# Patient Record
(demographics unavailable — no encounter records)

---

## 2024-10-17 NOTE — DISCUSSION/SUMMARY
[de-identified] : Chief complaint: Left lower extremity limp   HPI: Patient is a 31-year-old male with a history of autism who is nonverbal who presents the office today accompanied by his father for the evaluation of intermittent left lower extremity limp for approximately 6-7 weeks.  Patient's father reports that over the past 6-7 weeks the patient has been waking up with a limp to the left lower extremity which lasted for approximately 2 minutes and then resolves without any intervention.  He also reports that occasionally when the patient sits down for an extended period of time and then goes to stand he has a slight limp which again resolved without intervention after couple of minutes of walking/immobilization.  As per the patient's father the patient has been able to indicate pain by pointing in the past.  He has not been indicating any pain or discomfort since the onset of the limp as per the patient's father.  No reported intervention has been undertaken.  Father denies any previous injury or surgery to the patient's left lower extremity.  ROS: Positive for left lower extremity limp  Physical examination of the left lower extremity:  No appreciable edema No palpable masses No erythema, ecchymosis to the knee, lower leg, ankle, foot Patient does not follow directions in regards to active range of motion of the left hip, knee, ankle, or foot Good passive range of motion of the left hip without any appreciable associated discomfort, good passive range of motion in flexion, abduction, adduction, internal rotation, external rotation Good passive range of motion of the left knee in flexion and extension without any appreciable associated discomfort  No appreciable tenderness over the diffuse knee including the femoral condyles, patella, tibial plateau, lateral joint line, medial joint line Negative anterior drawer Negative Angelo's No appreciable laxity  with valgus/varus stress testing left calf to soft and nontender  Physical exam of the left foot and ankle shows:  No erythema No ecchymosis No edema good passive range of motion in dorsiflexion, plantarflexion, inversion, and eversion no appreciable tenderness over the medial malleolus no appreciable tenderness over the lateral malleolus no appreciable tenderness over the deltoid ligament no appreciable tenderness over the ATFL no appreciable tenderness over the CFL no appreciable tenderness over the PTFL no appreciable tenderness over the talar dome no appreciable appreciable tenderness over the achilles tendon  negative Vasquez's test no appreciable tenderness over the navicular, Lisfranc, base of the fifth metatarsal no appreciable tenderness over the metatarsals, MTP joints, or toes  2 view x-rays of the left femur performed in the office today show no obvious acute fracture, subluxation, or dislocation  2 view x-rays of the left tib-fib performed in the office today show no obvious acute fracture, subluxation, or dislocation  Assessment/plan: Left lower extremity pain, discussed possible etiologies of the patient's morning limp including but not limited to arthritis and plantar fasciitis, discussed treatment options, offered to provide the patient with a prescription for formal physical therapy however the patient's father states that the patient likely will not participate given his history of autism and difficulty following direction, discussed stretching exercises that can be done at home, patient can be given over-the-counter medication on an as-needed basis for any pain, I did discuss trying silicone heel cups for the patient's shoes to help offload pressure from the heel to see if there is any improvement in the patient's limp after immobility, I discussed with the father in detail that he can follow-up with us if there is any change or worsening of the patient's symptoms, they can follow-up with us on an as-needed basis, patient verbalized understanding of all findings in the office today, agrees to follow-up as directed